# Patient Record
(demographics unavailable — no encounter records)

---

## 2024-10-15 NOTE — CARDIOLOGY SUMMARY
[de-identified] : 10/15/24 Sinus bradycardia 58, RBBB, NSST 7/23/24 , RBBB, NSST 7/9/24 , RBB, NSST [de-identified] : 9/12/2024: CONCLUSIONS:   1. Left ventricular systolic function is normal. There are no regional wall motion abnormalities seen.  2. Normal right ventricular cavity size and normal right ventricular systolic function.  3. Structurally normal mitral valve with normal leaflet excursion. There is calcification of the mitral valve annulus. There is mild mitral regurgitation.  4. The aortic valve appears trileaflet with normal systolic excursion. There is calcification of the aortic valve leaflets. There is mild aortic regurgitation.  5. No atheroma in the visualized portions of the proximal ascending aorta. No atheroma in the visualized portions of the transverse aortic arch. No atheroma in the visualized portions of the descending aorta.  6. The left atrium is normal in size. There appears to be a Watchman closure device present in the left atrial appendage. The device is positioned normally. There is no residual flow observed. There is no device related thrombus seen. A Watchman left atrial appendage (DIANA) closure device is visualized and appears well seated at the ostium of the left atrial appendage. No thrombus seen in association with the Watchman device. No evidence of residual LA/DIANA communication (tavia-device leak) with colour Doppler interrogation.  7. Colour Doppler interrogation demonstrates evidence of left-to-right interatrial shunting at the mid-portion of the interatrial septum (likely at the site of a previous transseptal puncture). No right-to-left shunting was seen with the intravenous injection of agitated saline contrast.  6/24/24 TTE: LVIDd 4.6, LVEF 66%, no RWMA, mod RVE and normal RV systolic function, trace MR, mld TR, PASP 31mmHg  1/8/24 TTE: LVEF 61%, LVIDd 5.4cm, grade 2 DD, rnl RV size and function, mild MR, mild AI, mod TR, PASP 99  2/23/24 [place]: nl LV function, no RWMA, normal RV size and function, mild MR, mild AI, non-mobile atheroma in the visualized portions of transverse arch/descending aorta, intraatrial shunting L to R, likely the site of prior trans atrial puncture with no R to Left shunting  6/19/23 TTE: LVEF 60%, LVIDd 4.1, septum/PWT 1cm, indeterminant diastolic function given afib, normal RV size and function, mild-mod MR/AI, mod TR, est PASP 61, est RAP 10, no PFO [de-identified] :  6/6/24 RHC: Baseline- hypertensive: HR 73, /89 125, RA 4, PA 64/15 38, PCWP 17 (v28), LVEDP 18, PVR 2.2, Delano 9.5/4.3 PA sat 79, Ao 94, Hb 9.5 Repeat hemodynamics  6/6/24 RHC: Repeat hemodynamics after hydralazine 15mg and 1ug/kg/min Nipride : HR 95, /71 , PA 52/13 28, PCWP 12 (v15) PVR1.7, Delano 9.3/4.3, PAsat 70, Ao 94, Delano 9.3/4.3 Likely underestimation of PVR , due to overestimating cardiac output. TPG is 21. Etiology of PHTN is mixed. 1. Diastolic dysfunction worsened by uncontrolled BP. 2. Intrinsic pulmonary and pulmonary vascular disease.  2011 [facility] normal cors  1/17/2012 RHC with Ade study: baseline: RA 16, PA 89/32/54, Ao 93, PA 62.3, CO/CI 6.4/2.57, Ade 76/18/49, PCWP 35, Ao 100, Pa 72, CO.CI 7.27/2.91, PVR 1.92 Villalobos

## 2024-10-15 NOTE — CARDIOLOGY SUMMARY
[de-identified] : 10/15/24 Sinus bradycardia 58, RBBB, NSST 7/23/24 , RBBB, NSST 7/9/24 , RBB, NSST [de-identified] : 9/12/2024: CONCLUSIONS:   1. Left ventricular systolic function is normal. There are no regional wall motion abnormalities seen.  2. Normal right ventricular cavity size and normal right ventricular systolic function.  3. Structurally normal mitral valve with normal leaflet excursion. There is calcification of the mitral valve annulus. There is mild mitral regurgitation.  4. The aortic valve appears trileaflet with normal systolic excursion. There is calcification of the aortic valve leaflets. There is mild aortic regurgitation.  5. No atheroma in the visualized portions of the proximal ascending aorta. No atheroma in the visualized portions of the transverse aortic arch. No atheroma in the visualized portions of the descending aorta.  6. The left atrium is normal in size. There appears to be a Watchman closure device present in the left atrial appendage. The device is positioned normally. There is no residual flow observed. There is no device related thrombus seen. A Watchman left atrial appendage (DIANA) closure device is visualized and appears well seated at the ostium of the left atrial appendage. No thrombus seen in association with the Watchman device. No evidence of residual LA/DIANA communication (tavia-device leak) with colour Doppler interrogation.  7. Colour Doppler interrogation demonstrates evidence of left-to-right interatrial shunting at the mid-portion of the interatrial septum (likely at the site of a previous transseptal puncture). No right-to-left shunting was seen with the intravenous injection of agitated saline contrast.  6/24/24 TTE: LVIDd 4.6, LVEF 66%, no RWMA, mod RVE and normal RV systolic function, trace MR, mld TR, PASP 31mmHg  1/8/24 TTE: LVEF 61%, LVIDd 5.4cm, grade 2 DD, rnl RV size and function, mild MR, mild AI, mod TR, PASP 99  2/23/24 [place]: nl LV function, no RWMA, normal RV size and function, mild MR, mild AI, non-mobile atheroma in the visualized portions of transverse arch/descending aorta, intraatrial shunting L to R, likely the site of prior trans atrial puncture with no R to Left shunting  6/19/23 TTE: LVEF 60%, LVIDd 4.1, septum/PWT 1cm, indeterminant diastolic function given afib, normal RV size and function, mild-mod MR/AI, mod TR, est PASP 61, est RAP 10, no PFO [de-identified] :  6/6/24 RHC: Baseline- hypertensive: HR 73, /89 125, RA 4, PA 64/15 38, PCWP 17 (v28), LVEDP 18, PVR 2.2, Delano 9.5/4.3 PA sat 79, Ao 94, Hb 9.5 Repeat hemodynamics  6/6/24 RHC: Repeat hemodynamics after hydralazine 15mg and 1ug/kg/min Nipride : HR 95, /71 , PA 52/13 28, PCWP 12 (v15) PVR1.7, Delano 9.3/4.3, PAsat 70, Ao 94, Delano 9.3/4.3 Likely underestimation of PVR , due to overestimating cardiac output. TPG is 21. Etiology of PHTN is mixed. 1. Diastolic dysfunction worsened by uncontrolled BP. 2. Intrinsic pulmonary and pulmonary vascular disease.  2011 [facility] normal cors  1/17/2012 RHC with Ade study: baseline: RA 16, PA 89/32/54, Ao 93, PA 62.3, CO/CI 6.4/2.57, Ade 76/18/49, PCWP 35, Ao 100, Pa 72, CO.CI 7.27/2.91, PVR 1.92 Villalobos

## 2024-10-15 NOTE — HISTORY OF PRESENT ILLNESS
[FreeTextEntry1] : Reza Lopez is a 68 y/o man with Hx of HTN, HLD, DM2, PVD, WHO Group II pulmonary hypertension, HFpEF (LVEF 61%, LVIDd 5.4cm, normal RV function), GIGI not using CPAP), afib/flutter s/p DCCV 6/19/23 s/p ablation 8/2023, s/p Watchman 1/8/24 (Now on Aspirin 81mg alone) and now s/p PVI, LA posterior and mitral annular flutter ablation on 9/12/2024 who presents for a routine follow up post ablation. Maintained on metoprolol 100 mg bid and ASA.  States he is doing well since ablation.   Right groin without pain, swelling, or bruising. States his hydralazine was increased to 75 mg QID for elevated B/P.   Denies chest pain, palpitations, SOB, syncope or near syncope.

## 2024-10-15 NOTE — PHYSICAL EXAM
[Well Developed] : well developed [Well Nourished] : well nourished [No Acute Distress] : no acute distress [Normal Conjunctiva] : normal conjunctiva [Normal Venous Pressure] : normal venous pressure [No Carotid Bruit] : no carotid bruit [Normal S1, S2] : normal S1, S2 [Clear Lung Fields] : clear lung fields [Good Air Entry] : good air entry [No Respiratory Distress] : no respiratory distress  [Soft] : abdomen soft [Non Tender] : non-tender [No Masses/organomegaly] : no masses/organomegaly [Normal Bowel Sounds] : normal bowel sounds [Normal Gait] : normal gait [No Cyanosis] : no cyanosis [No Clubbing] : no clubbing [No Varicosities] : no varicosities [No Rash] : no rash [No Skin Lesions] : no skin lesions [Moves all extremities] : moves all extremities [No Focal Deficits] : no focal deficits [Normal Speech] : normal speech [Alert and Oriented] : alert and oriented [Normal memory] : normal memory [de-identified] : tachy 100's [de-identified] : Trace lower extremity edema b/l

## 2024-10-15 NOTE — PHYSICAL EXAM
[Well Developed] : well developed [Well Nourished] : well nourished [No Acute Distress] : no acute distress [Normal Conjunctiva] : normal conjunctiva [Normal Venous Pressure] : normal venous pressure [No Carotid Bruit] : no carotid bruit [Normal S1, S2] : normal S1, S2 [Clear Lung Fields] : clear lung fields [Good Air Entry] : good air entry [No Respiratory Distress] : no respiratory distress  [Soft] : abdomen soft [Non Tender] : non-tender [No Masses/organomegaly] : no masses/organomegaly [Normal Bowel Sounds] : normal bowel sounds [Normal Gait] : normal gait [No Cyanosis] : no cyanosis [No Clubbing] : no clubbing [No Varicosities] : no varicosities [No Rash] : no rash [No Skin Lesions] : no skin lesions [Moves all extremities] : moves all extremities [No Focal Deficits] : no focal deficits [Normal Speech] : normal speech [Alert and Oriented] : alert and oriented [Normal memory] : normal memory [de-identified] : tachy 100's [de-identified] : Trace lower extremity edema b/l

## 2024-10-15 NOTE — DISCUSSION/SUMMARY
[EKG obtained to assist in diagnosis and management of assessed problem(s)] : EKG obtained to assist in diagnosis and management of assessed problem(s) [FreeTextEntry1] : Impression:  1. Atrial tachycardia:  EKG performed today to assess for presence of conduction disease, pre-excitation or atrial fibrillation reveals Sinus bradycardia. . Maintained on metoprolol 100 mg PO BID and ASA.  Will continue current meds.   2. HTN; Encourage heart healthy diet, sodium restriction and weight management. Continue regular f/u with Cardiologist for further HTN management.  3. DM: will decrease Treisiba night prior to procedure to 14 u night prior.     Continue f/u with Cardiologist and RTO for f/u in 3 months

## 2024-10-15 NOTE — HISTORY OF PRESENT ILLNESS
[FreeTextEntry1] : Reza Lopez is a 66 y/o man with Hx of HTN, HLD, DM2, PVD, WHO Group II pulmonary hypertension, HFpEF (LVEF 61%, LVIDd 5.4cm, normal RV function), GIGI not using CPAP), afib/flutter s/p DCCV 6/19/23 s/p ablation 8/2023, s/p Watchman 1/8/24 (Now on Aspirin 81mg alone) and now s/p PVI, LA posterior and mitral annular flutter ablation on 9/12/2024 who presents for a routine follow up post ablation. Maintained on metoprolol 100 mg bid and ASA.  States he is doing well since ablation.   Right groin without pain, swelling, or bruising. States his hydralazine was increased to 75 mg QID for elevated B/P.   Denies chest pain, palpitations, SOB, syncope or near syncope.

## 2024-10-15 NOTE — CARDIOLOGY SUMMARY
[de-identified] : 10/15/24 Sinus bradycardia 58, RBBB, NSST 7/23/24 , RBBB, NSST 7/9/24 , RBB, NSST [de-identified] : 9/12/2024: CONCLUSIONS:   1. Left ventricular systolic function is normal. There are no regional wall motion abnormalities seen.  2. Normal right ventricular cavity size and normal right ventricular systolic function.  3. Structurally normal mitral valve with normal leaflet excursion. There is calcification of the mitral valve annulus. There is mild mitral regurgitation.  4. The aortic valve appears trileaflet with normal systolic excursion. There is calcification of the aortic valve leaflets. There is mild aortic regurgitation.  5. No atheroma in the visualized portions of the proximal ascending aorta. No atheroma in the visualized portions of the transverse aortic arch. No atheroma in the visualized portions of the descending aorta.  6. The left atrium is normal in size. There appears to be a Watchman closure device present in the left atrial appendage. The device is positioned normally. There is no residual flow observed. There is no device related thrombus seen. A Watchman left atrial appendage (DIANA) closure device is visualized and appears well seated at the ostium of the left atrial appendage. No thrombus seen in association with the Watchman device. No evidence of residual LA/DIANA communication (tavia-device leak) with colour Doppler interrogation.  7. Colour Doppler interrogation demonstrates evidence of left-to-right interatrial shunting at the mid-portion of the interatrial septum (likely at the site of a previous transseptal puncture). No right-to-left shunting was seen with the intravenous injection of agitated saline contrast.  6/24/24 TTE: LVIDd 4.6, LVEF 66%, no RWMA, mod RVE and normal RV systolic function, trace MR, mld TR, PASP 31mmHg  1/8/24 TTE: LVEF 61%, LVIDd 5.4cm, grade 2 DD, rnl RV size and function, mild MR, mild AI, mod TR, PASP 99  2/23/24 [place]: nl LV function, no RWMA, normal RV size and function, mild MR, mild AI, non-mobile atheroma in the visualized portions of transverse arch/descending aorta, intraatrial shunting L to R, likely the site of prior trans atrial puncture with no R to Left shunting  6/19/23 TTE: LVEF 60%, LVIDd 4.1, septum/PWT 1cm, indeterminant diastolic function given afib, normal RV size and function, mild-mod MR/AI, mod TR, est PASP 61, est RAP 10, no PFO [de-identified] :  6/6/24 RHC: Baseline- hypertensive: HR 73, /89 125, RA 4, PA 64/15 38, PCWP 17 (v28), LVEDP 18, PVR 2.2, Delano 9.5/4.3 PA sat 79, Ao 94, Hb 9.5 Repeat hemodynamics  6/6/24 RHC: Repeat hemodynamics after hydralazine 15mg and 1ug/kg/min Nipride : HR 95, /71 , PA 52/13 28, PCWP 12 (v15) PVR1.7, Delano 9.3/4.3, PAsat 70, Ao 94, Delano 9.3/4.3 Likely underestimation of PVR , due to overestimating cardiac output. TPG is 21. Etiology of PHTN is mixed. 1. Diastolic dysfunction worsened by uncontrolled BP. 2. Intrinsic pulmonary and pulmonary vascular disease.  2011 [facility] normal cors  1/17/2012 RHC with Ade study: baseline: RA 16, PA 89/32/54, Ao 93, PA 62.3, CO/CI 6.4/2.57, Ade 76/18/49, PCWP 35, Ao 100, Pa 72, CO.CI 7.27/2.91, PVR 1.92 Villalobos

## 2024-10-15 NOTE — PHYSICAL EXAM
[Well Developed] : well developed [Well Nourished] : well nourished [No Acute Distress] : no acute distress [Normal Conjunctiva] : normal conjunctiva [Normal Venous Pressure] : normal venous pressure [No Carotid Bruit] : no carotid bruit [Normal S1, S2] : normal S1, S2 [Clear Lung Fields] : clear lung fields [Good Air Entry] : good air entry [No Respiratory Distress] : no respiratory distress  [Soft] : abdomen soft [Non Tender] : non-tender [No Masses/organomegaly] : no masses/organomegaly [Normal Bowel Sounds] : normal bowel sounds [Normal Gait] : normal gait [No Cyanosis] : no cyanosis [No Clubbing] : no clubbing [No Varicosities] : no varicosities [No Rash] : no rash [No Skin Lesions] : no skin lesions [Moves all extremities] : moves all extremities [No Focal Deficits] : no focal deficits [Normal Speech] : normal speech [Alert and Oriented] : alert and oriented [Normal memory] : normal memory [de-identified] : tachy 100's [de-identified] : Trace lower extremity edema b/l

## 2024-11-18 NOTE — REVIEW OF SYSTEMS
[FreeTextEntry1] : 14 point ROS done and found to be negative or noncontributory other than noted in HPI.

## 2024-11-18 NOTE — REASON FOR VISIT
[Cardiac Failure] : cardiac failure [Other: ____] : [unfilled] [FreeTextEntry3] : Dr. Jason Dinh [FreeTextEntry1] : Cards: Dr. Jason Dinh EP: Dr. Emre Gaytan Pulm:

## 2024-11-18 NOTE — HISTORY OF PRESENT ILLNESS
[FreeTextEntry1] : Mr. Lopez is a 66 y/o M with a PMH of HTN, HLD, DM2, PVD, afib/flutter s/p DCCV 6/19/23 s/p ablation 8/2023, s/p watchman 1/8/24, WHO Group II pulmonary hypertension, HFpEF (LVEF 61%, LVIDd 5.4cm, normal RV function) who presents today for follow up of his cardiomyopathy. Referred by his cardiologist, Dr. Jason Dinh.

## 2024-11-18 NOTE — DISCUSSION/SUMMARY
[FreeTextEntry1] : 67yrs, followed by Dennis Spangler.  Dr Gaytan for EP.  HTN, lipids, DM2, AiC 5.4,  Stage III CKD 2.7-3.0. No nephrologist.  Followed by hematology for epogen.  Chonic venous insufficency.  HFpEF, PHTN.  Afib May 2023. DCCV June 2023, Ablation Aug 2023. Watchman Jan 2024 (epistaxis). Now on ASA alone.  Only HF hosp is 2012. Managed on oral diuretics.  2011: [facility] normal cors. /110 152, LVEDP 44.  2012: RHC- RA 16, PA 89/32 54, Ao 93, PA 62, Delano 6.4/2.6  KALLIE PA 76/18, 49, PCWP 35, Delano 7.3/2.9 PVR 1.9  TTE Jan 2024: LVEF 61, LVEDD 5.4, DD, normal RV. mild MR mild AI. mod TR. PASP 99.  RHC/LHC and pulmonary angiogram June 2024 (by myself): At baseline he was hypertensive 173/89 RA 4, PA 64/15 38, PCWP 17 (v28), LVEDP 18, PVR 2.2, Delano 9.5/4.3 PA sat 79, Ao 94, Repeat hemodynamics after treating his BP (nipride/hydralazine) : /71 , PA 52/13 28, PCWP 12 (v15) PVR 1.7, Delano 9.3/4.3, PAsat 70, Ao 94, Delano 9.3/4.3  Pulmonary angiogram showed atretic tortuous vessels on the right and on the left a large main branch with no run off. There was no appropriate anatomy for the device on either side.  Conclusions from cath: These pressures were done a week after d/c the low dose diuretics I told him to stay off diuretics and follow his weight. While he says his BP is well controlled at home (120s), I suspect that may not always be true- he had high BPs in our office too. Needs close BP follow up.  We are likely underestimating his PVR , due to overestimating his cardiac output. TPG is 21.  Etiology of PHTN is mixed. 1. Diastolic dysfunction worsened by uncontrolled BP. 2. Intrinsic pulmonary and pulmonary  vascular disease. He is not a candidate for a CTA or MRA due to his renal disease. He is also intolerant of AC. Pulmonary to decide if further pulmonary work up is warranted.  Sept 2024: Admitted with aflutter. Underwent ablation Dr Gaytan.   [place]:  normal BIV function. mild MR mild . mild-mod TR.  July 2024 PYP no amyloid.  Was supposed to f/u with Dr Henao for pulmonary angiogram, not in nephrology f/u. has not seen pulmonologist since cath  meds: toprol Xl 100 bid,  losartan 50, ald 25, HDZN 75 qid, clonidine .3 QID, imdur 60, statin, asa, ozempic. diabetes meds, torsemide 20 PRN.  SBP at home 125/-137/  Feeling well. Losing weight with ozempic. 1 mile per day.  148/72, 63 225 (221)  Plan:  Continue tight control of BP.  Review labs then resume torsemide 20 tiw.  then d/c aldactone, change losartan to entresto low dose with close f/u f/u: cardiorenal Dr Henao (hpulmonary angiogram).  Pulmonary  NP telehealth  2 weeks. NP visit one month.  See me 3 mths Jeffery Rossi  45 mins with patient and chart

## 2024-11-18 NOTE — CARDIOLOGY SUMMARY
[de-identified] : 9/12/24 [place]: LV function normal, RV normal, watchman closure device in place, no -R-L shunting, mld AI, mld MR/TR,   6/24/24 TTE: LVIDd 4.6, LVEF 66%, no RWMA, mod RVE and normal RV systolic function, trace MR, mld TR, PASP 31mmHg  1/8/24 TTE: LVEF 61%, LVIDd 5.4cm, grade 2 DD, rnl RV size and function, mild MR, mild AI, mod TR, PASP 99  2/23/24 [place]: nl LV function, no RWMA, normal RV size and function, mild MR, mild AI, non-mobile atheroma in the visualized portions of transverse arch/descending aorta, intraatrial shunting L to R, likely the site of prior trans atrial puncture with no R to Left shunting  6/19/23 TTE: LVEF 60%, LVIDd 4.1, septum/PWT 1cm, indeterminant diastolic function given afib, normal RV size and function, mild-mod MR/AI, mod TR, est PASP 61, est RAP 10, no PFO [de-identified] :  6/6/24 RHC: Baseline- hypertensive: HR 73, /89 125, RA 4, PA 64/15 38, PCWP 17 (v28), LVEDP 18, PVR 2.2, Delano 9.5/4.3 PA sat 79, Ao 94, Hb 9.5 Repeat hemodynamics  6/6/24 RHC: Repeat hemodynamics after hydralazine 15mg and 1ug/kg/min Nipride : HR 95, /71 , PA 52/13 28, PCWP 12 (v15) PVR1.7, Delano 9.3/4.3, PAsat 70, Ao 94, Delano 9.3/4.3 Likely underestimation of PVR , due to overestimating cardiac output. TPG is 21. Etiology of PHTN is mixed. 1. Diastolic dysfunction worsened by uncontrolled BP. 2. Intrinsic pulmonary and pulmonary vascular disease.  2011 [facility] normal cors  1/17/2012 RHC with Ade study: baseline: RA 16, PA 89/32/54, Ao 93, PA 62.3, CO/CI 6.4/2.57, Ade 76/18/49, PCWP 35, Ao 100, Pa 72, CO.CI 7.27/2.91, PVR 1.92 Villalobos

## 2024-12-16 NOTE — PHYSICAL EXAM
[Normal] : alert and oriented, normal memory [de-identified] : JVP 8 cmH20 [de-identified] : trace BLE edema

## 2024-12-16 NOTE — ASSESSMENT
[FreeTextEntry1] : Mr. Lopez is a 66 y/o M with a PMH of HTN, HLD, DM2, PVD, afib/flutter s/p DCCV 6/19/23 s/p ablation 8/2023, s/p watchman 1/8/24, WHO Group II pulmonary hypertension, HFpEF (LVEF 61%, LVIDd 5.4cm, normal RV function) who presents today for follow up of his cardiomyopathy. Referred by his cardiologist, Dr. Jason Dinh.  He is ACC/AHA Stage C, NYHA Class II symptoms, near euvolemic, normotensive on change to ARNi.   #HFpEF -c/w Imdur 60mg QD, clonidine 0.3mg QID, HDZN 75mg QID, on entresto 24/26mg BID -Diuretics: take torsemide 20mg 3x this week and then resume to PRN -12/10/24 Labs: K4.7, BUN/Cr 53/3.04, proBNP 2058 -Will obtain repeat labs from his [facility] clinic on 12/18 (he will fax over) -Continue to log SBP and daily weights. Monitor diet with high salt  #HTN -c/w Imdur 60mg QD, clonidine 0.3mg QID, HDZN 75mg QID, on entresto 24/26mg BID  #Afib/aflutter -s/p DCCV 6/19/23, s/p ablation 8/2023, watchman 1/8/24 -f/u with Dr. Gaytan -on metoprolol tartrate 100mg BID.  #pulmonary hypertension -WHO Group II -f/u with Dr. Marcos  Will see Dr. Rossi in 2 months

## 2024-12-16 NOTE — CARDIOLOGY SUMMARY
[de-identified] : 9/12/24 [place]: LV function normal, RV normal, watchman closure device in place, no -R-L shunting, mld AI, mld MR/TR,   6/24/24 TTE: LVIDd 4.6, LVEF 66%, no RWMA, mod RVE and normal RV systolic function, trace MR, mld TR, PASP 31mmHg  1/8/24 TTE: LVEF 61%, LVIDd 5.4cm, grade 2 DD, rnl RV size and function, mild MR, mild AI, mod TR, PASP 99  2/23/24 [place]: nl LV function, no RWMA, normal RV size and function, mild MR, mild AI, non-mobile atheroma in the visualized portions of transverse arch/descending aorta, intraatrial shunting L to R, likely the site of prior trans atrial puncture with no R to Left shunting  6/19/23 TTE: LVEF 60%, LVIDd 4.1, septum/PWT 1cm, indeterminant diastolic function given afib, normal RV size and function, mild-mod MR/AI, mod TR, est PASP 61, est RAP 10, no PFO [de-identified] :  6/6/24 RHC: Baseline- hypertensive: HR 73, /89 125, RA 4, PA 64/15 38, PCWP 17 (v28), LVEDP 18, PVR 2.2, Delano 9.5/4.3 PA sat 79, Ao 94, Hb 9.5 Repeat hemodynamics  6/6/24 RHC: Repeat hemodynamics after hydralazine 15mg and 1ug/kg/min Nipride : HR 95, /71 , PA 52/13 28, PCWP 12 (v15) PVR1.7, Delano 9.3/4.3, PAsat 70, Ao 94, Delano 9.3/4.3 Likely underestimation of PVR , due to overestimating cardiac output. TPG is 21. Etiology of PHTN is mixed. 1. Diastolic dysfunction worsened by uncontrolled BP. 2. Intrinsic pulmonary and pulmonary vascular disease.  2011 [facility] normal cors  1/17/2012 RHC with Ade study: baseline: RA 16, PA 89/32/54, Ao 93, PA 62.3, CO/CI 6.4/2.57, Ade 76/18/49, PCWP 35, Ao 100, Pa 72, CO.CI 7.27/2.91, PVR 1.92 Villalobos

## 2024-12-16 NOTE — HISTORY OF PRESENT ILLNESS
[FreeTextEntry1] : Mr. Lopez is a 68 y/o M with a PMH of HTN, HLD, DM2, PVD, afib/flutter s/p DCCV 6/19/23 s/p ablation 8/2023, s/p watchman 1/8/24, WHO Group II pulmonary hypertension, HFpEF (LVEF 61%, LVIDd 5.4cm, normal RV function) who presents today for follow up of his cardiomyopathy. Referred by his cardiologist, Dr. Jason Dinh.  Last visit was with Dr. Rossi on 11/18 and one telehealth visit on 12/3/24. Cr improved to 3.04 and is on torsemide PRN and started on entresto 24/26mg BID.   he feels good overall - and able to walk 1 mile at a time without any SOB (has not walked often d/t the cold) but he otherwise has good AT without any limitation. Still has some BLE edema but is improving overall and his SBP at home has been around 110-120, weight at home has been 217-218 and is endorsing good diet. He endorses some abdominal bloating and took 1 torsemide yesterday.   He otherwise denies any CP/palpitation, SOB at rest, LH/dizzy orthopnea.

## 2025-05-19 NOTE — CARDIOLOGY SUMMARY
[de-identified] : 9/12/24 [place]: LV function normal, RV normal, watchman closure device in place, no -R-L shunting, mld AI, mld MR/TR,   6/24/24 TTE: LVIDd 4.6, LVEF 66%, no RWMA, mod RVE and normal RV systolic function, trace MR, mld TR, PASP 31mmHg  1/8/24 TTE: LVEF 61%, LVIDd 5.4cm, grade 2 DD, rnl RV size and function, mild MR, mild AI, mod TR, PASP 99  2/23/24 [place]: nl LV function, no RWMA, normal RV size and function, mild MR, mild AI, non-mobile atheroma in the visualized portions of transverse arch/descending aorta, intraatrial shunting L to R, likely the site of prior trans atrial puncture with no R to Left shunting  6/19/23 TTE: LVEF 60%, LVIDd 4.1, septum/PWT 1cm, indeterminant diastolic function given afib, normal RV size and function, mild-mod MR/AI, mod TR, est PASP 61, est RAP 10, no PFO [de-identified] :  6/6/24 RHC: Baseline- hypertensive: HR 73, /89 125, RA 4, PA 64/15 38, PCWP 17 (v28), LVEDP 18, PVR 2.2, Delano 9.5/4.3 PA sat 79, Ao 94, Hb 9.5 Repeat hemodynamics  6/6/24 RHC: Repeat hemodynamics after hydralazine 15mg and 1ug/kg/min Nipride : HR 95, /71 , PA 52/13 28, PCWP 12 (v15) PVR1.7, Delano 9.3/4.3, PAsat 70, Ao 94, Delano 9.3/4.3 Likely underestimation of PVR , due to overestimating cardiac output. TPG is 21. Etiology of PHTN is mixed. 1. Diastolic dysfunction worsened by uncontrolled BP. 2. Intrinsic pulmonary and pulmonary vascular disease.  2011 [facility] normal cors  1/17/2012 RHC with Ade study: baseline: RA 16, PA 89/32/54, Ao 93, PA 62.3, CO/CI 6.4/2.57, Ade 76/18/49, PCWP 35, Ao 100, Pa 72, CO.CI 7.27/2.91, PVR 1.92 Villalobos

## 2025-05-19 NOTE — CARDIOLOGY SUMMARY
[de-identified] : 9/12/24 [place]: LV function normal, RV normal, watchman closure device in place, no -R-L shunting, mld AI, mld MR/TR,   6/24/24 TTE: LVIDd 4.6, LVEF 66%, no RWMA, mod RVE and normal RV systolic function, trace MR, mld TR, PASP 31mmHg  1/8/24 TTE: LVEF 61%, LVIDd 5.4cm, grade 2 DD, rnl RV size and function, mild MR, mild AI, mod TR, PASP 99  2/23/24 [place]: nl LV function, no RWMA, normal RV size and function, mild MR, mild AI, non-mobile atheroma in the visualized portions of transverse arch/descending aorta, intraatrial shunting L to R, likely the site of prior trans atrial puncture with no R to Left shunting  6/19/23 TTE: LVEF 60%, LVIDd 4.1, septum/PWT 1cm, indeterminant diastolic function given afib, normal RV size and function, mild-mod MR/AI, mod TR, est PASP 61, est RAP 10, no PFO [de-identified] :  6/6/24 RHC: Baseline- hypertensive: HR 73, /89 125, RA 4, PA 64/15 38, PCWP 17 (v28), LVEDP 18, PVR 2.2, Delano 9.5/4.3 PA sat 79, Ao 94, Hb 9.5 Repeat hemodynamics  6/6/24 RHC: Repeat hemodynamics after hydralazine 15mg and 1ug/kg/min Nipride : HR 95, /71 , PA 52/13 28, PCWP 12 (v15) PVR1.7, Delano 9.3/4.3, PAsat 70, Ao 94, Delano 9.3/4.3 Likely underestimation of PVR , due to overestimating cardiac output. TPG is 21. Etiology of PHTN is mixed. 1. Diastolic dysfunction worsened by uncontrolled BP. 2. Intrinsic pulmonary and pulmonary vascular disease.  2011 [facility] normal cors  1/17/2012 RHC with Ade study: baseline: RA 16, PA 89/32/54, Ao 93, PA 62.3, CO/CI 6.4/2.57, Ade 76/18/49, PCWP 35, Ao 100, Pa 72, CO.CI 7.27/2.91, PVR 1.92 Villalobos

## 2025-05-19 NOTE — DISCUSSION/SUMMARY
[FreeTextEntry1] : Last visit nov 2024. 2 NP visits.  67yrs, followed by Dennis Spangler.  Dr Gaytan for EP.  HTN, lipids, DM2, AiC 5.4,  Stage III CKD 2.7-3.0. No nephrologist.  Followed by hematology for epogen.  Chonic venous insufficency.  HFpEF, PHTN.  Afib May 2023. DCCV June 2023, Ablation Aug 2023. Watchman Jan 2024 (epistaxis). Now on ASA alone.  Only HF hosp is 2012. Managed on oral diuretics.  2011: [facility] normal cors. /110 152, LVEDP 44.  2012: RHC- RA 16, PA 89/32 54, Ao 93, PA 62, Delano 6.4/2.6  KALLIE PA 76/18, 49, PCWP 35, Delano 7.3/2.9 PVR 1.9  TTE Jan 2024: LVEF 61, LVEDD 5.4, DD, normal RV. mild MR mild AI. mod TR. PASP 99.  RHC/LHC and pulmonary angiogram June 2024 (by myself): At baseline he was hypertensive 173/89 RA 4, PA 64/15 38, PCWP 17 (v28), LVEDP 18, PVR 2.2, Delano 9.5/4.3 PA sat 79, Ao 94, Repeat hemodynamics after treating his BP (nipride/hydralazine) : /71 , PA 52/13 28, PCWP 12 (v15) PVR 1.7, Delano 9.3/4.3, PAsat 70, Ao 94, Delano 9.3/4.3  Pulmonary angiogram showed atretic tortuous vessels on the right and on the left a large main branch with no run off. There was no appropriate anatomy for the device on either side.  Conclusions from cath: These pressures were done a week after d/c the low dose diuretics I told him to stay off diuretics and follow his weight. While he says his BP is well controlled at home (120s), I suspect that may not always be true- he had high BPs in our office too. Needs close BP follow up.  We are likely underestimating his PVR , due to overestimating his cardiac output. TPG is 21.  Etiology of PHTN is mixed. 1. Diastolic dysfunction worsened by uncontrolled BP. 2. Intrinsic pulmonary and pulmonary  vascular disease. He is not a candidate for a CTA or MRA due to his renal disease. He is also intolerant of AC. Pulmonary to decide if further pulmonary work up is warranted.  Sept 2024: Admitted with aflutter. Underwent ablation Dr Gaytan.   [place]:  normal BIV function. mild MR mild . mild-mod TR.  July 2024 PYP no amyloid.  We have repeatedly asked him to f/u with Dr Henao for pulmonary angiogram, and pulmonary which he has not done.  April 2025: hospitalized for ADHF. Diuresis. 30lbs. Planned f/u with Dr Velázquez. Positive collagen vascular seen by rheumatology. ? HDZN.  Admission 243 Cr 2.6, d/c 212. Cr 4.6  April 18th: Normal LVEF. RVE, normal function. mild-mod TR. 74mmHg.   meds: labetalol 200 bid, clonidine .2 tid, novasc 5, statin, torsemide 40 bid, asa, (entresto d/c)  imdur 60, , munjaro. diabetes meds,  Feeling fine since d/c  150/77, 64, 216 (d/c 212)  Imp:  See my note above re the RHC I did last year. I remain concerned that we underestimated his PVR due to overestimation of cardiac output and that he has mixed pre and post capillary hypertension that may be amenable to treatment. Will refer him to Dr Parry to this end.  As per my prior visit, he should also see Dr Henao to evaluate his pulmonary vasculature (pulmonary angiogram at time of mems) which may be contributing his pulmonary hypertenion.  Patient should f/u with rheymatology who can review all final serology and opine on the possibility of underlying connective tissue disease.  Patient is no in follow up with Dr Velázquez who will complete his renal w/u (secondary HTN) and help manage his BP.  Should stay on his current dose of diuretics and follow weights closely. His current weight is 215lbs Not a candidate for aRNI, aldactone. SGLTi at present.  NP visit 1 month, see me 2 months.  Jeffery Rossi  40 mins with patient and chart excluding EKG.

## 2025-05-19 NOTE — HISTORY OF PRESENT ILLNESS
[FreeTextEntry1] : Mr. Lopez is a 69 y/o M with a PMH of HTN, HLD, DM2, PVD, afib/flutter s/p DCCV 6/19/23 s/p ablation 8/2023, s/p watchman 1/8/24, WHO Group II pulmonary hypertension, HFpEF (LVEF 61%, LVIDd 5.4cm, normal RV function) who presents today for follow up of his cardiomyopathy. Referred by his cardiologist, Dr. Jason Dinh.

## 2025-07-17 NOTE — HISTORY OF PRESENT ILLNESS
[Never] : never [TextBox_4] : This letter  is regarding your patient  who  attended pulmonary out patient office today.  I have reviewed  patient's  past history, social history, family history and medication list. I also  reviewed nurse practitioners/ and fellows  notes and assessment and agree with it.   The patient was referred by   67 y/o M with a PMH of HTN, HLD, DM2, PVD, afib/flutter s/p DCCV 6/19/23 s/p ablation 8/2023, s/p watchman 1/8/24, WHO Group II pulmonary hypertension, HFpEF (LVEF 61%, LVIDd 5.4cm, normal RV function) cardiomyopathy.  Follows Dr. Jason Dinh.  pft 2024 mild restriction  Echo: 9/12/24 [place]: LV function normal, RV normal, watchman closure device in place, no -R-L shunting, mld AI, mld MR/TR,  1/17/2012 RHC with Ade study: baseline: RA 16, PA 89/32/54, Ao 93, PA 62.3, CO/CI 6.4/2.57, Ade 76/18/49, PCWP 35, Ao 100, Pa 72, CO.CI 7.27/2.91, PVR 1.92 Villalobos  6/6/24 RHC:  Cardiac Cath/PCI: 6/6/24 RHC: Baseline- hypertensive: HR 73, /89 125, RA 4, PA 64/15 38, PCWP 17 (v28), LVEDP 18, PVR 2.2, Delano 9.5/4.3 PA sat 79, Ao 94, Hb 9.5  Repeat hemodynamics after hydralazine 15mg and 1ug/kg/min Nipride : HR 95, /71 , PA 52/13 28, PCWP 12 (v15) PVR1.7, Delano 9.3/4.3, PAsat 70, Ao 94, Delano 9.3/4.3 Likely underestimation of PVR , due to overestimating cardiac output. TPG is 21. Etiology of PHTN is mixed. 1. Diastolic dysfunction worsened by uncontrolled BP. 2. Intrinsic pulmonary and pulmonary vascular disease.----  cardiac SPECT 7/2024 IMPRESSION: Cardiac amyloid SPECT/CT is  not suggestive of transthyretin cardiac amyloidosis.  Denies any present pulm issues [TextBox_100] : 3/2024 [TextBox_108] : 6

## 2025-07-17 NOTE — HISTORY OF PRESENT ILLNESS
[Never] : never [TextBox_4] : This letter  is regarding your patient  who  attended pulmonary out patient office today.  I have reviewed  patient's  past history, social history, family history and medication list. I also  reviewed nurse practitioners/ and fellows  notes and assessment and agree with it.   The patient was referred by   69 y/o M with a PMH of HTN, HLD, DM2, PVD, afib/flutter s/p DCCV 6/19/23 s/p ablation 8/2023, s/p watchman 1/8/24, WHO Group II pulmonary hypertension, HFpEF (LVEF 61%, LVIDd 5.4cm, normal RV function) cardiomyopathy.  Follows Dr. Jason Dinh.  pft 2024 mild restriction  Echo: 9/12/24 [place]: LV function normal, RV normal, watchman closure device in place, no -R-L shunting, mld AI, mld MR/TR,  1/17/2012 RHC with Ade study: baseline: RA 16, PA 89/32/54, Ao 93, PA 62.3, CO/CI 6.4/2.57, Ade 76/18/49, PCWP 35, Ao 100, Pa 72, CO.CI 7.27/2.91, PVR 1.92 Villalobos  6/6/24 RHC:  Cardiac Cath/PCI: 6/6/24 RHC: Baseline- hypertensive: HR 73, /89 125, RA 4, PA 64/15 38, PCWP 17 (v28), LVEDP 18, PVR 2.2, Delano 9.5/4.3 PA sat 79, Ao 94, Hb 9.5  Repeat hemodynamics after hydralazine 15mg and 1ug/kg/min Nipride : HR 95, /71 , PA 52/13 28, PCWP 12 (v15) PVR1.7, Delano 9.3/4.3, PAsat 70, Ao 94, Delano 9.3/4.3 Likely underestimation of PVR , due to overestimating cardiac output. TPG is 21. Etiology of PHTN is mixed. 1. Diastolic dysfunction worsened by uncontrolled BP. 2. Intrinsic pulmonary and pulmonary vascular disease.----  cardiac SPECT 7/2024 IMPRESSION: Cardiac amyloid SPECT/CT is  not suggestive of transthyretin cardiac amyloidosis.  Denies any present pulm issues [TextBox_100] : 3/2024 [TextBox_108] : 6

## 2025-07-17 NOTE — DISCUSSION/SUMMARY
[FreeTextEntry1] : ---Assessment plan----------The patient has been referred here for further opinion regarding pulmonary problem, 67 y/o M with a PMH of HTN, HLD, DM2, PVD, afib/flutter s/p DCCV 6/19/23 s/p ablation 8/2023, s/p watchman 1/8/24, WHO Group II pulmonary hypertension[POST CAPILLAERY PULM HTN]  , HFpEF (LVEF 61%, LVIDd 5.4cm, normal RV function) cardiomyopathy.  1) Group 2 pulm htn- keep euvolemic ---- 2) 6mwt today not sure he will benefit from selective pulmonary vasodilator treatment 3) CT chest 4) labs in office today 5) features of gustavo including snoring- get sleep study 6) cards follow up 7) scrotal edema- referred to urology 8) f/u in 8 weeks  Thanks for allowing  me to participate  in the care of this patient.  Patient at this time  will follow  the above mentioned recommendations and return back for follow up visit. If you have any questions  I can be reached  at # [phone] (office).  Dannielle Parry MD, [facility]  Pulmonary, Critical Care and Sleep Medicine

## 2025-07-17 NOTE — PHYSICAL EXAM
[No Acute Distress] : no acute distress [Normal Appearance] : normal appearance [No Neck Mass] : no neck mass [Normal Rate/Rhythm] : normal rate/rhythm [Normal S1, S2] : normal s1, s2 [No Murmurs] : no murmurs [No Resp Distress] : no resp distress [Clear to Auscultation Bilaterally] : clear to auscultation bilaterally [No Abnormalities] : no abnormalities [Benign] : benign [Normal Gait] : normal gait [No Clubbing] : no clubbing [No Cyanosis] : no cyanosis [No Edema] : no edema [FROM] : FROM [Normal Color/ Pigmentation] : normal color/ pigmentation [No Focal Deficits] : no focal deficits [Oriented x3] : oriented x3 [Normal Affect] : normal affect [III] : Mallampati Class: III

## 2025-07-17 NOTE — END OF VISIT
[FreeTextEntry3] :   I, Dr. Dannielle Parry  personally performed the evaluation and management (E/M) services for this established patient who presents today with (a) new problem(s)/exacerbation of (an) existing condition(s). That E/M includes conducting the clinically appropriate interval history &/or exam, assessing all new/exacerbated conditions, and establishing a new plan of care. Today, my KRYS, Jessica Walton NP, , was here to observe my evaluation and management service for this new problem/exacerbated condition and follow the plan of care established by me going forward.  [Time Spent: ___ minutes] : I have spent [unfilled] minutes of time on the encounter which excludes teaching and separately reported services.

## 2025-07-17 NOTE — DISCUSSION/SUMMARY
[FreeTextEntry1] : ---Assessment plan----------The patient has been referred here for further opinion regarding pulmonary problem, 67 y/o M with a PMH of HTN, HLD, DM2, PVD, afib/flutter s/p DCCV 6/19/23 s/p ablation 8/2023, s/p watchman 1/8/24, WHO Group II pulmonary hypertension[POST CAPILLAERY PULM HTN]  , HFpEF (LVEF 61%, LVIDd 5.4cm, normal RV function) cardiomyopathy.  1) Group 2 pulm htn- keep euvolemic ---- 2) 6mwt today not sure he will benefit from selective pulmonary vasodilator treatment 3) CT chest 4) labs in office today 5) features of gustavo including snoring- get sleep study 6) cards follow up 7) scrotal edema- referred to urology 8) f/u in 8 weeks  Thanks for allowing  me to participate  in the care of this patient.  Patient at this time  will follow  the above mentioned recommendations and return back for follow up visit. If you have any questions  I can be reached  at # [phone] (office).  Dannielle Parry MD, [facility]  Pulmonary, Critical Care and Sleep Medicine

## 2025-07-17 NOTE — DISCUSSION/SUMMARY
[FreeTextEntry1] : ---Assessment plan----------The patient has been referred here for further opinion regarding pulmonary problem, 69 y/o M with a PMH of HTN, HLD, DM2, PVD, afib/flutter s/p DCCV 6/19/23 s/p ablation 8/2023, s/p watchman 1/8/24, WHO Group II pulmonary hypertension[POST CAPILLAERY PULM HTN]  , HFpEF (LVEF 61%, LVIDd 5.4cm, normal RV function) cardiomyopathy.  1) Group 2 pulm htn- keep euvolemic ---- 2) 6mwt today not sure he will benefit from selective pulmonary vasodilator treatment 3) CT chest 4) labs in office today 5) features of gustavo including snoring- get sleep study 6) cards follow up 7) scrotal edema- referred to urology 8) f/u in 8 weeks  Thanks for allowing  me to participate  in the care of this patient.  Patient at this time  will follow  the above mentioned recommendations and return back for follow up visit. If you have any questions  I can be reached  at # [phone] (office).  Dannielle Parry MD, [facility]  Pulmonary, Critical Care and Sleep Medicine

## 2025-07-29 NOTE — PHYSICAL EXAM
[General Appearance - Alert] : alert [General Appearance - In No Acute Distress] : in no acute distress [Nasal Cavity] : the nasal mucosa and septum were normal [Respiration, Rhythm And Depth] : normal respiratory rhythm and effort [Auscultation Breath Sounds / Voice Sounds] : lungs were clear to auscultation bilaterally [Heart Rate And Rhythm] : heart rate was normal and rhythm regular [Heart Sounds] : normal S1 and S2 [Abdomen Soft] : soft [Abdomen Tenderness] : non-tender [Musculoskeletal - Swelling] : no joint swelling seen [Oriented To Time, Place, And Person] : oriented to person, place, and time [Impaired Insight] : insight and judgment were intact [FreeTextEntry1] : Stasis dermatiis LE

## 2025-07-29 NOTE — CONSULT LETTER
[Dear  ___] : Dear  [unfilled], [Courtesy Letter:] : I had the pleasure of seeing your patient, [unfilled], in my office today. [Please see my note below.] : Please see my note below. [Consult Closing:] : Thank you very much for allowing me to participate in the care of this patient.  If you have any questions, please do not hesitate to contact me. [Sincerely,] : Sincerely, [FreeTextEntry3] : Lanny Britton MD Director, [facility],  Rheumatology Division, Department of Medicine ,  [employer]  at [address], [address] [place] [address] Tel: [phone]

## 2025-07-29 NOTE — CONSULT LETTER
[Dear  ___] : Dear  [unfilled], [Courtesy Letter:] : I had the pleasure of seeing your patient, [unfilled], in my office today. [Please see my note below.] : Please see my note below. [Consult Closing:] : Thank you very much for allowing me to participate in the care of this patient.  If you have any questions, please do not hesitate to contact me. [Sincerely,] : Sincerely, [FreeTextEntry3] : Lanny Britton MD Director, [facility],  Rheumatology Division, Department of Medicine ,  [employer]  at [address], [address] [place] [address] Tel: [phone]

## 2025-07-29 NOTE — ASSESSMENT
[FreeTextEntry1] : # Low positive multiple serologies (+ dsDNA and + CCP ab)  - no clinical features of  inflammatory arthritis  - likely drug induced serology - history of longstanding hydralazine use, now off since 4/2025 # Pulmonary HTN   - PASP 109mmHg # HFpEF with RV dysfunction - TTE EF WNL, grade 3 diastolic dysfunction, enlarged RV cavity with reduced systolic function, LA severely dilated, no pericardial effusion, severe TR, # JUSTINE on CKD with SCr 4.2 (baseline 2.6-3), U/A 300 protein moderate blood 158 RBCs 25 casts, urine protein/Cr 5.9 - Serologies with dsDNA 30,  negative ANCA , + CCP 60, negative RF, SSA/SSB,  neg scleroderma antibodies - most likely due to DM, aaltough hematuria is not expected in diabetic neohropathy. while renal biopsy would be needed to definitively rule out drug-induced SLE or vasculitis, but was deferred due to extremely high risk of the procedure. Clinically dx of vasculitis/ SLE- unlikely # venous insufficiency/ venous stasis dermatitis   = will repeat serology with more specific assays - crithidia = no indications for IS / immunomodulatory therapy  RTO as needed

## 2025-07-29 NOTE — HISTORY OF PRESENT ILLNESS
[FreeTextEntry1] : The patient was admitted to [facility] with  ADHF associated to anasarca c/b JUSTINE on CKD. Rheumatology consulted  duering hospital admission because of  positive dsDNA and CCP.  # Low titer Positive dsDNA and Positive CCP ilikely drug induced due to longstanding hydralazine use = on longstanding hydralazine use = No evidence of inflammatory arthritis by history or on exam  = no rashes, ulcers, Raynaud's, serositis, cytopenias t  = dsDNA 30, CCP 60, negative RF, SSA/SSB, ANCA, and scleroderma antibodies = COLBY DFS70 pattern, histone negative, C3 and C4 normal, MPO, PR3 neg  # JUSTINE on CKD, hematuria, and proteinuria - SCr 4.2 (baseline 2.6-3), U/A 300 protein moderate blood 158 RBCs 25 casts, urine protein/Cr 5.9- the etiology of CKD is most likely due to DM, aaltough hematuria is not expected in diabetic neohropathy. while renal biopsy would be needed to definitively rule out drug-induced SLE or vasculitis, but was deferred due to extremely high risk  of the procedure. Clinically dx of vasculitis/ SLE- unlikely  PMH : HFpEF with RV dysfunction, pHTN group 2, CKD 3, Aflutter s/p ablation, s/p watchman 1/2024, hx of HTN and insulin-dependent DM,   -TTE EF WNL, grade 3 diastolic dysfunction, enlarged RV cavity with reduced systolic function, LA severely dilated, no pericardial effusion, severe TR, PASP 109 mmHg       
[FreeTextEntry1] : The patient was admitted to [facility] with  ADHF associated to anasarca c/b JUSTINE on CKD. Rheumatology consulted  duering hospital admission because of  positive dsDNA and CCP.  # Low titer Positive dsDNA and Positive CCP ilikely drug induced due to longstanding hydralazine use = on longstanding hydralazine use = No evidence of inflammatory arthritis by history or on exam  = no rashes, ulcers, Raynaud's, serositis, cytopenias t  = dsDNA 30, CCP 60, negative RF, SSA/SSB, ANCA, and scleroderma antibodies = COLBY DFS70 pattern, histone negative, C3 and C4 normal, MPO, PR3 neg  # JUSTINE on CKD, hematuria, and proteinuria - SCr 4.2 (baseline 2.6-3), U/A 300 protein moderate blood 158 RBCs 25 casts, urine protein/Cr 5.9- the etiology of CKD is most likely due to DM, aaltough hematuria is not expected in diabetic neohropathy. while renal biopsy would be needed to definitively rule out drug-induced SLE or vasculitis, but was deferred due to extremely high risk  of the procedure. Clinically dx of vasculitis/ SLE- unlikely  PMH : HFpEF with RV dysfunction, pHTN group 2, CKD 3, Aflutter s/p ablation, s/p watchman 1/2024, hx of HTN and insulin-dependent DM,   -TTE EF WNL, grade 3 diastolic dysfunction, enlarged RV cavity with reduced systolic function, LA severely dilated, no pericardial effusion, severe TR, PASP 109 mmHg       
none